# Patient Record
Sex: MALE | Race: WHITE | Employment: OTHER | URBAN - METROPOLITAN AREA
[De-identification: names, ages, dates, MRNs, and addresses within clinical notes are randomized per-mention and may not be internally consistent; named-entity substitution may affect disease eponyms.]

---

## 2019-03-21 ENCOUNTER — HOSPITAL ENCOUNTER (OUTPATIENT)
Dept: CT IMAGING | Age: 84
Discharge: HOME | End: 2019-03-21
Attending: FAMILY MEDICINE
Payer: MEDICARE

## 2019-03-21 DIAGNOSIS — R22.2 MASS IN CHEST: ICD-10-CM

## 2019-03-21 LAB — CREAT BLD-MCNC: 1.1 MG/DL (ref 0.6–1.3)

## 2019-03-21 PROCEDURE — 74011636320 HC RX REV CODE- 636/320: Performed by: RADIOLOGY

## 2019-03-21 PROCEDURE — 82565 ASSAY OF CREATININE: CPT

## 2019-03-21 PROCEDURE — 71260 CT THORAX DX C+: CPT

## 2019-03-21 RX ADMIN — IOPAMIDOL 100 ML: 612 INJECTION, SOLUTION INTRAVENOUS at 12:02

## 2020-10-19 ENCOUNTER — TRANSCRIBE ORDER (OUTPATIENT)
Dept: SCHEDULING | Age: 85
End: 2020-10-19

## 2020-10-19 DIAGNOSIS — E11.9 DIABETES MELLITUS (HCC): ICD-10-CM

## 2020-10-19 DIAGNOSIS — I10 ESSENTIAL HYPERTENSION, MALIGNANT: ICD-10-CM

## 2020-10-19 DIAGNOSIS — Z63.4 BEREAVEMENT, UNCOMPLICATED: ICD-10-CM

## 2020-10-19 DIAGNOSIS — K92.1 BLOOD IN STOOL: ICD-10-CM

## 2020-10-19 DIAGNOSIS — R10.9 STOMACH ACHE: Primary | ICD-10-CM

## 2020-10-19 SDOH — SOCIAL STABILITY - SOCIAL INSECURITY: DISSAPEARANCE AND DEATH OF FAMILY MEMBER: Z63.4

## 2020-10-23 ENCOUNTER — HOSPITAL ENCOUNTER (OUTPATIENT)
Dept: CT IMAGING | Age: 85
Discharge: HOME | End: 2020-10-23
Payer: MEDICARE

## 2020-10-23 DIAGNOSIS — R10.9 STOMACH ACHE: ICD-10-CM

## 2020-10-23 DIAGNOSIS — E11.9 DIABETES MELLITUS (HCC): ICD-10-CM

## 2020-10-23 DIAGNOSIS — I10 ESSENTIAL HYPERTENSION, MALIGNANT: ICD-10-CM

## 2020-10-23 DIAGNOSIS — Z63.4 BEREAVEMENT, UNCOMPLICATED: ICD-10-CM

## 2020-10-23 DIAGNOSIS — K92.1 BLOOD IN STOOL: ICD-10-CM

## 2020-10-23 PROCEDURE — 74176 CT ABD & PELVIS W/O CONTRAST: CPT

## 2020-10-23 SDOH — SOCIAL STABILITY - SOCIAL INSECURITY: DISSAPEARANCE AND DEATH OF FAMILY MEMBER: Z63.4

## 2021-12-07 ENCOUNTER — TRANSCRIBE ORDER (OUTPATIENT)
Dept: REGISTRATION | Age: 86
End: 2021-12-07

## 2021-12-07 ENCOUNTER — HOSPITAL ENCOUNTER (OUTPATIENT)
Dept: LAB | Age: 86
Discharge: HOME | End: 2021-12-07
Payer: MEDICARE

## 2021-12-07 DIAGNOSIS — Z01.812 PRE-PROCEDURAL LABORATORY EXAMINATIONS: ICD-10-CM

## 2021-12-07 DIAGNOSIS — Z01.812 PRE-PROCEDURAL LABORATORY EXAMINATIONS: Primary | ICD-10-CM

## 2021-12-07 PROCEDURE — U0005 INFEC AGEN DETEC AMPLI PROBE: HCPCS

## 2021-12-08 ENCOUNTER — ANESTHESIA EVENT (OUTPATIENT)
Dept: ENDOSCOPY | Age: 86
End: 2021-12-08
Payer: MEDICARE

## 2021-12-08 LAB
SARS-COV-2, XPLCVT: NOT DETECTED
SOURCE, COVRS: NORMAL

## 2021-12-10 ENCOUNTER — ANESTHESIA (OUTPATIENT)
Dept: ENDOSCOPY | Age: 86
End: 2021-12-10
Payer: MEDICARE

## 2021-12-10 ENCOUNTER — HOSPITAL ENCOUNTER (OUTPATIENT)
Age: 86
Setting detail: OUTPATIENT SURGERY
Discharge: HOME | End: 2021-12-10
Attending: SPECIALIST | Admitting: SPECIALIST
Payer: MEDICARE

## 2021-12-10 VITALS
WEIGHT: 165.57 LBS | HEIGHT: 68 IN | BODY MASS INDEX: 25.09 KG/M2 | DIASTOLIC BLOOD PRESSURE: 71 MMHG | HEART RATE: 70 BPM | OXYGEN SATURATION: 96 % | RESPIRATION RATE: 16 BRPM | SYSTOLIC BLOOD PRESSURE: 127 MMHG | TEMPERATURE: 97.8 F

## 2021-12-10 PROCEDURE — 74011000250 HC RX REV CODE- 250: Performed by: NURSE ANESTHETIST, CERTIFIED REGISTERED

## 2021-12-10 PROCEDURE — 76060000032 HC ANESTHESIA 0.5 TO 1 HR: Performed by: SPECIALIST

## 2021-12-10 PROCEDURE — 76040000007: Performed by: SPECIALIST

## 2021-12-10 PROCEDURE — 2709999900 HC NON-CHARGEABLE SUPPLY: Performed by: SPECIALIST

## 2021-12-10 PROCEDURE — 77030013992 HC SNR POLYP ENDOSC BSC -B: Performed by: SPECIALIST

## 2021-12-10 PROCEDURE — 88305 TISSUE EXAM BY PATHOLOGIST: CPT

## 2021-12-10 PROCEDURE — 74011250636 HC RX REV CODE- 250/636: Performed by: NURSE ANESTHETIST, CERTIFIED REGISTERED

## 2021-12-10 RX ORDER — PROPOFOL 10 MG/ML
INJECTION, EMULSION INTRAVENOUS AS NEEDED
Status: DISCONTINUED | OUTPATIENT
Start: 2021-12-10 | End: 2021-12-10 | Stop reason: HOSPADM

## 2021-12-10 RX ORDER — FLUMAZENIL 0.1 MG/ML
0.2 INJECTION INTRAVENOUS
Status: DISCONTINUED | OUTPATIENT
Start: 2021-12-10 | End: 2021-12-10 | Stop reason: HOSPADM

## 2021-12-10 RX ORDER — FENTANYL CITRATE 50 UG/ML
25 INJECTION, SOLUTION INTRAMUSCULAR; INTRAVENOUS AS NEEDED
Status: DISCONTINUED | OUTPATIENT
Start: 2021-12-10 | End: 2021-12-10 | Stop reason: HOSPADM

## 2021-12-10 RX ORDER — PROPOFOL 10 MG/ML
INJECTION, EMULSION INTRAVENOUS
Status: DISCONTINUED | OUTPATIENT
Start: 2021-12-10 | End: 2021-12-10 | Stop reason: HOSPADM

## 2021-12-10 RX ORDER — DEXTROMETHORPHAN/PSEUDOEPHED 2.5-7.5/.8
1.2 DROPS ORAL
Status: DISCONTINUED | OUTPATIENT
Start: 2021-12-10 | End: 2021-12-10 | Stop reason: HOSPADM

## 2021-12-10 RX ORDER — LISINOPRIL 20 MG/1
20 TABLET ORAL DAILY
COMMUNITY

## 2021-12-10 RX ORDER — METFORMIN HYDROCHLORIDE 500 MG/1
500 TABLET ORAL 2 TIMES DAILY WITH MEALS
COMMUNITY

## 2021-12-10 RX ORDER — NALOXONE HYDROCHLORIDE 0.4 MG/ML
0.4 INJECTION, SOLUTION INTRAMUSCULAR; INTRAVENOUS; SUBCUTANEOUS
Status: DISCONTINUED | OUTPATIENT
Start: 2021-12-10 | End: 2021-12-10 | Stop reason: HOSPADM

## 2021-12-10 RX ORDER — ASPIRIN 81 MG/1
TABLET ORAL DAILY
COMMUNITY

## 2021-12-10 RX ORDER — ZINC GLUCONATE 50 MG
TABLET ORAL
COMMUNITY

## 2021-12-10 RX ORDER — AMLODIPINE BESYLATE 5 MG/1
5 TABLET ORAL DAILY
COMMUNITY

## 2021-12-10 RX ORDER — SODIUM CHLORIDE 9 MG/ML
50 INJECTION, SOLUTION INTRAVENOUS CONTINUOUS
Status: DISCONTINUED | OUTPATIENT
Start: 2021-12-10 | End: 2021-12-10 | Stop reason: HOSPADM

## 2021-12-10 RX ORDER — OMEPRAZOLE 20 MG/1
20 CAPSULE, DELAYED RELEASE ORAL DAILY
COMMUNITY

## 2021-12-10 RX ORDER — LATANOPROST 50 UG/ML
1 SOLUTION/ DROPS OPHTHALMIC
COMMUNITY

## 2021-12-10 RX ORDER — ATORVASTATIN CALCIUM 80 MG/1
80 TABLET, FILM COATED ORAL DAILY
COMMUNITY

## 2021-12-10 RX ORDER — LIDOCAINE HYDROCHLORIDE 20 MG/ML
INJECTION, SOLUTION EPIDURAL; INFILTRATION; INTRACAUDAL; PERINEURAL AS NEEDED
Status: DISCONTINUED | OUTPATIENT
Start: 2021-12-10 | End: 2021-12-10 | Stop reason: HOSPADM

## 2021-12-10 RX ORDER — MIDAZOLAM HYDROCHLORIDE 1 MG/ML
.25-5 INJECTION, SOLUTION INTRAMUSCULAR; INTRAVENOUS AS NEEDED
Status: DISCONTINUED | OUTPATIENT
Start: 2021-12-10 | End: 2021-12-10 | Stop reason: HOSPADM

## 2021-12-10 RX ORDER — CHOLECALCIFEROL (VITAMIN D3) 125 MCG
CAPSULE ORAL
COMMUNITY

## 2021-12-10 RX ADMIN — PROPOFOL INJECTABLE EMULSION 30 MG: 10 INJECTION, EMULSION INTRAVENOUS at 13:19

## 2021-12-10 RX ADMIN — PROPOFOL 120 MCG/KG/MIN: 10 INJECTION, EMULSION INTRAVENOUS at 13:27

## 2021-12-10 RX ADMIN — PROPOFOL INJECTABLE EMULSION 80 MG: 10 INJECTION, EMULSION INTRAVENOUS at 13:16

## 2021-12-10 RX ADMIN — LIDOCAINE HYDROCHLORIDE 100 MG: 20 INJECTION, SOLUTION INTRAVENOUS at 13:14

## 2021-12-10 NOTE — PROGRESS NOTES
Endoscopy discharge instructions have been reviewed and given to patient. The patient verbalized understanding and acceptance of instructions. Dr. Monie Calvert discussed with patient procedure findings and next steps.

## 2021-12-10 NOTE — INTERVAL H&P NOTE
Pre-Endoscopy H&P Update  Chief complaint/HPI/ROS:  The indication for the procedure, the patient's history and the patient's current medications are reviewed prior to the procedure and that data is reported on the H&P to which this document is attached. Any significant complaints with regard to organ systems will be noted, and if not mentioned then a review of systems is not contributory. Past Medical History:   Diagnosis Date    Back pain     CAD (coronary artery disease)     3 stents    Diabetes ([address]     Diabetes mellitus, type II ([facility]     Hypertension       Past Surgical History:   Procedure Laterality Date    HX COLONOSCOPY      x2    HX CORONARY ARTERY BYPASS GRAFT      3 stents placed     Social   Social History     Tobacco Use    Smoking status: Never Smoker    Smokeless tobacco: Never Used   Substance Use Topics    Alcohol use: Yes     Comment: 1-2x week       Family History   Problem Relation Age of Onset    Kidney Disease Mother     Heart Attack Father     Heart Disease Brother       Allergies   Allergen Reactions    Latex, Natural Rubber Rash and Itching    Ambien [Zolpidem] Other (comments)     Sleep walks, loses time      Prior to Admission Medications   Prescriptions Last Dose Informant Patient Reported? Taking? Magnesium Oxide 500 mg cap   Yes Yes   Sig: Take  by mouth. amLODIPine (NORVASC) 5 mg tablet 12/10/2021 at Unknown time  Yes Yes   Sig: Take 5 mg by mouth daily. aspirin delayed-release 81 mg tablet 12/10/2021 at Unknown time  Yes Yes   Sig: Take  by mouth daily. atorvastatin (LIPITOR) 80 mg tablet 12/10/2021 at Unknown time  Yes Yes   Sig: Take 80 mg by mouth daily. cholecalciferol, vitamin D3, (Vitamin D3) 50 mcg (2,000 unit) tab   Yes Yes   Sig: Take  by mouth. lactobacillus rhamnosus  (PROBIOTIC DIGESTIVE CARE PO)   Yes Yes   Sig: Take  by mouth.   latanoprost (XALATAN) 0.005 % ophthalmic solution   Yes Yes   Sig: Administer 1 Drop to both eyes nightly.    lisinopriL (PRINIVIL, ZESTRIL) 20 mg tablet 12/10/2021 at Unknown time  Yes Yes   Sig: Take 20 mg by mouth daily. metFORMIN (GLUCOPHAGE) 500 mg tablet 12/10/2021 at Unknown time  Yes Yes   Sig: Take 500 mg by mouth two (2) times daily (with meals). omeprazole (PRILOSEC) 20 mg capsule   Yes Yes   Sig: Take 20 mg by mouth daily. Facility-Administered Medications: None       PHYSICAL EXAM:  The patient is examined immediately prior to the procedure. Visit Vitals  /71   Pulse 70   Temp 97.8 °F (36.6 °C)   Resp 16   Ht 5' 8\" (1.727 m)   Wt 75.1 kg (165 lb 9.1 oz)   SpO2 96%   BMI 25.17 kg/m²     Gen: Appears comfortable, no distress. Pulm: comfortable respirations with no abnormal audible breath sounds  HEART: well perfused, no abnormal audible heart sounds  GI: abdomen flat. PLAN:  Informed consent discussion held, patient afforded an opportunity to ask questions and all questions answered. After being advised of the risks, benefits, and alternatives, the patient requested that we proceed and indicated so on a written consent form. Will proceed with procedure as planned.   Thao Emery MD

## 2021-12-10 NOTE — ANESTHESIA POSTPROCEDURE EVALUATION
Procedure(s):  ESOPHAGOGASTRODUODENOSCOPY (EGD)  COLONOSCOPY  ENDOSCOPIC POLYPECTOMY. MAC    Anesthesia Post Evaluation        Patient location during evaluation: PACU  Level of consciousness: awake  Pain management: adequate  Airway patency: patent  Anesthetic complications: no  Cardiovascular status: acceptable  Respiratory status: acceptable  Hydration status: acceptable  Post anesthesia nausea and vomiting:  none      INITIAL Post-op Vital signs:   Vitals Value Taken Time   /71 12/10/21 1355   Temp 36.6 °C (97.8 °F) 12/10/21 1340   Pulse 68 12/10/21 1356   Resp 16 12/10/21 1356   SpO2 85 % 12/10/21 1400   Vitals shown include unvalidated device data.

## 2021-12-10 NOTE — ANESTHESIA PREPROCEDURE EVALUATION
Relevant Problems   No relevant active problems       Anesthetic History   No history of anesthetic complications            Review of Systems / Medical History  Patient summary reviewed, nursing notes reviewed and pertinent labs reviewed    Pulmonary  Within defined limits                 Neuro/Psych   Within defined limits           Cardiovascular    Hypertension          CAD and cardiac stents         GI/Hepatic/Renal  Within defined limits              Endo/Other    Diabetes         Other Findings            Physical Exam    Airway  Mallampati: II  TM Distance: 4 - 6 cm  Neck ROM: normal range of motion   Mouth opening: Normal     Cardiovascular    Rhythm: regular  Rate: normal         Dental    Dentition: Lower dentition intact and Upper dentition intact     Pulmonary  Breath sounds clear to auscultation               Abdominal         Other Findings            Anesthetic Plan    ASA: 3  Anesthesia type: MAC          Induction: Intravenous  Anesthetic plan and risks discussed with: Patient

## 2021-12-10 NOTE — PROGRESS NOTES
Mauro Sethi .  1935  [id number]    Situation:  Verbal report received from:  Keith Connors RN   Procedure: Procedure(s):  ESOPHAGOGASTRODUODENOSCOPY (EGD)  COLONOSCOPY  ENDOSCOPIC POLYPECTOMY    Background:    Preoperative diagnosis: Melena [K92.1]  Abdominal pain, unspecified abdominal location [R10.9]  Postoperative diagnosis: EGD: normal exam  Colon: transverse colon polyp, diverticulosis, hemorrhoids    :  Dr. Vivian Clark   Assistant(s): Endoscopy Technician-1: Arnaud Stratton  Endoscopy RN-1: Miguel Ángel Franco RN    Specimens:   ID Type Source Tests Collected by Time Destination   1 : transverse colon polyp Preservative Colon, Transverse  Bg Mckeon MD 12/10/2021 1332 Pathology     H. Pylori  no    Assessment:  Intra-procedure medications       Anesthesia gave intra-procedure sedation and medications, see anesthesia flow sheet yes    Intravenous fluids: NS@ KVO     Vital signs stable   yes    Abdominal assessment: round and soft   yes    Recommendation:  Discharge patient per MD order  yes.   Return to floor  outpatient  Family or Friend   Spouse   Permission to share finding with family or friend yes

## 2021-12-10 NOTE — PROCEDURES
[address] [facility] JUAN Welch MD  [phone]      December 10, 2021    Esophagogastroduodenoscopy & Colonoscopy Procedure Note  Sen Fernandez SrMina  : 1935  [employer] Medical Record Number: [id number]      Indications:    Melena/hematochezia Personal history of colon polyps (screening only)   Referring Physician:  Etelvina Etienne MD  Anesthesia/Sedation: Conscious Sedation/Moderate Sedation/MAC  Endoscopist:  Dr. Kc Rubin  Complications:  None  Estimated Blood Loss:  None    Permit:  The indications, risks, benefits and alternatives were reviewed with the patient or their decision maker who was provided an opportunity to ask questions and all questions were answered. The specific risks of esophagogastroduodenoscopy with conscious sedation were reviewed, including but not limited to anesthetic complication, bleeding, adverse drug reaction, missed lesion, infection, IV site reactions, and intestinal perforation which would lead to the need for surgical repair. Alternatives to EGD and colonoscopy including radiographic imaging, observation without testing, or laboratory testing were reviewed as well as the limitations of those alternatives discussed. After considering the options and having all their questions answered, the patient or their decision maker provided both verbal and written consent to proceed. -----------EGD------------   Procedure in Detail:  After obtaining informed consent, positioning of the patient in the left lateral decubitus position, and conduction of a pre-procedure pause or \"time out\" the endoscope was introduced into the mouth and advanced to the duodenum. A careful inspection was made, and findings or interventions are described below.     Findings:   Esophagus:normal  Stomach: normal   Duodenum/jejunum: normal        ----------Colonoscopy-----------    Procedure in Detail:  After obtaining informed consent, positioning of the patient in the left lateral decubitus position, and conduction of a pre-procedure pause or \"time out\" the endoscope was introduced into the anus and advanced to the cecum, which was identified by the ileocecal valve and appendiceal orifice. The quality of the colonic preparation was good. A careful inspection was made as the colonoscope was withdrawn, findings and interventions are described below. Findings:   4mm polyp in the transverse colon removed with cold snare, retrieved. There is diverticulosis in the sigmoid colon without complications such as bleeding, inflammatory change, or luminal narrowing. In the rectum, medium internal hemorrhoids are noted without bleeding.      ------------------------------  Specimens:    See above    Complications:   None; patient tolerated the procedure well. Impressions:  EGD:  Normal.  Colonoscopy: Colon polyp, diverticulosis, hemorrhoids      Recommendations:     - Await pathology. Thank you for entrusting me with this patient's care. Please do not hesitate to contact me with any questions or if I can be of assistance with any of your other patients' GI needs. Signed By: Brayan Otero MD                        December 10, 2021    Surgical assistant none. Implants none unless specified.

## 2021-12-10 NOTE — DISCHARGE INSTRUCTIONS
[address] [facility] JUAN Nielsen MD  [phone]      December 10, 2021    [address] [place] [address] N: 1935    COLONOSCOPY DISCHARGE INSTRUCTIONS    If there is redness at IV site you should apply warm compress to area. If redness or soreness persist contact Dr. Galilea Nielsen' or your primary care doctor. There may be a slight amount of blood passed from the rectum. Gaseous discomfort may develop, but walking, belching will help relieve this. You may not operate a vehicle for 12 hours  You may not operate machinery or dangerous appliances for rest of today  You may not drink alcoholic beverages for 12 hours  Avoid making any critical decisions for 24 hours    DIET:  You may resume your normal diet, but some patients find that heavy or large meals may lead to indigestion or vomiting. I suggest a light meal as first food intake. MEDICATIONS:  The use of some over-the-counter pain medication may lead to bleeding after colon biopsies or polyp removal.  Tylenol (also called acetaminophen) is safe to take even if you have had colonoscopy with polyp removal.  Based on the procedure you had today you may not safely take aspirin or aspirin-like products for the next ten (10) days. Remember that Tylenol (also called acetaminophen) is safe to take after colonoscopy even if you have had biopsies or polyps removed. ACTIVITY:  You may resume your normal household activities, but it is recommended that you spend the remainder of the day resting -  avoid any strenuous activity. CALL DR. Malcolm Cerrato' OFFICE IF:  Increasing pain, nausea, vomiting  Abdominal distension (swelling)  Significant new or increased bleeding (oral or rectal)  Fever/Chills  Chest pain/shortness of breath                       Additional instructions: We found and removed one small polyp but no other problems noted.    No aspirin 10 days, I will contact you with the polyp results in about a week. It was an honor to be your doctor today. Please let me or my office staff know if you have any feedback about today's procedure. Stvee Nguyen MD    Colonoscopy saves lives, and can prevent colon cancer. Everyone aged 48 or older needs colonoscopy.   Tell your family and friends: get the test!

## 2021-12-10 NOTE — H&P
Date: 10/21/2021 2:30 PM  Patient Name: Alon Petit  Account #: [id number]  Gender: Male   (age): 1935 (85)  Provider:   Anneliese Leon. Ashwini Gay MD  Referring Physician:   Estela Kraft [place], [address] [place] [address]  [phone] (phone)  [phone] (fax)  Chief Complaint:    I've seen some black stool again. History of Present Illness:  68-year-old male who had endoscopy and colonoscopy 18 months ago without significant findings reports a reoccurrence of intermittent black stool. This is in association with crampy upper abdominal pain which radiates to the bilateral lower quadrant. This is also in association with intermittent problems with diarrhea. He visited a local emergency room 3 months ago at laboratory studies and CT scan without overt finding. At that time the emergency room doctor consider irritable bowel syndrome to be a likely explanation for his symptoms and prescribed dicyclomine. He notes that when he gets the crampy pain and he takes dicyclomine it gets better. We discussed the indications, risks, benefits, and alternatives to diagnostic upper endoscopy and colonoscopy in light of his concern for gastrointestinal blood loss and he asked that we proceed.   Past Medical History  Medical Conditions:   Chest pain  High blood pressure  S/P Stents x3  Surgical Procedures:   Stents 3X  Knee Surgery ( Left )  Gallbladder surgery  Dx Studies:   Abdominal U/S, 01/10/16  Colonoscopy, 2020  Colonoscopy, 12/10/10  CT Scan, 16  EGD, 2020  Endoscopy, 16  Pre-Procedure Call, 2016  Medications:   amlodipine5 mgTake 1 tablet by mouth once a day as directed  zatyalc23 mgTake 1 tablet by mouth once a day as directed  cezvmwfg15 mgTake 1 tablet by mouth once a day as directed  amebcgamnicr66 mgTake 1 tablet by mouth once a day as directed  cholecalciferol (vitamin D3)50 mcg (2,000 unit)Take 1 tablet by mouth once a day as directed  latanoprost0.005%Instill as needed  oyrrppzzdz88 mgTake 1 tablet by mouth once a day as directed  emrqmllvp951 mgTake 1 tablet by mouth twice a day as directed  jlhezbxwuh56 mgTake 1 capsule by mouth once a day as directed  Uuyowcjxm76 billion cellTake 1 capsule by mouth once a day as directed  Allergies:   Ambien  Latex  Immunizations:   Influenza, seasonal, injectable, 09/01/2021  Flu vaccine, 09/01/2020  zoster, 2010  Pneumococcal conjugate PCV 13, 2015/ 09/2019  Social History  Alcohol:   None  Tobacco:   Never smoker  Drugs:   None  Exercise:   None  Caffeine:   None  Marital Status:         Occupation:    Retired     Family History   No history of Colon Cancer, Colon Polyps, Esophogeal Cancer, GI Cancers, Liver disease  Son: Diagnosed with Crohn's disease or ulcerative colitis; Review of Systems:  Cardiovascular: Denies chest pain, irregular heart beat, palpitations, peripheral edema, syncope, Sweats. Constitutional: Denies fatigue, fever, loss of appetite, weight gain, weight loss. ENMT: Denies nose bleeds, sore throat, hearing loss. Endocrine: Denies excessive thirst, heat intolerance. Eyes: Denies loss of vision. Gastrointestinal: Denies abdominal pain, abdominal swelling, change in bowel habits, constipation, diarrhea, Bloating/gas, heartburn, jaundice, nausea, rectal bleeding, stomach cramps, vomiting, dysphagia, rectal pain, Stool incontinence, hematemesis. Genitourinary: Denies dark urine, dysuria, frequent urination, hematuria, incontinence. Hematologic/Lymphatic: Denies easy bruising, prolonged bleeding. Integumentary: Denies itching, rashes, sun sensitivity. Musculoskeletal: Denies arthritis, back pain, gout, joint pain, muscle weakness, stiffness. Neurological: Denies dizziness, fainting, frequent headaches, memory loss. Psychiatric: Denies anxiety, depression, difficulty sleeping, hallucinations, nervousness, panic attacks, paranoia. Respiratory: Denies cough, dyspnea, wheezing.   Vital Signs: BP  (mmHg)  Pulse  (ppm) Weight (lbs/oz) Height (ft/in) BMI Temp  121/82 77 169 / 5 / 8 25.69 99 (F)  Physical Exam:  Constitutional:  Appearance: No distress, appears comfortable. Communication: Understands/receives spoken information. Skin:  Inspection: No rash, no jaundice. Head/face: Inspection: Normacephalic, atraumatic. Eyes:  Conjunctivae/lids: Normal.  Pupils/irises: Pupils equal, round and normal.  ENMT:  External: Normal.  Hearing: Normal.  Neck:  Neck: Normal appearance, trachea midline. Jugular veins: No JVD noted. Respiratory:  Effort: Normal respiratory effort, comfortable, speaks in complete sentences. Auscultation: normal breath sounds, no rubs, wheezes or rhonchi. Gastrointestinal/Abdomen:  Hernias: There is protrusion on valsava left inguinal > right. No incarceration. .  Musculoskeletal:  Gait/station: normal.  Digits/nails: Normal, no spooning of nails, clubbing, or splinter hemorrhages, no clubbing, cyanosis, petechiae or other inflammatory conditions. Psychiatric:  Judgment/insight: Normal, normal judgement, normal insight. Orientation: oriented to time, space and person. Lab Results: No Electronic Results  Impressions:   Melena  Plan:   Education handout on BMI Healthy Weight  Upper Endoscopy  Colonoscopy  Risk & Medical Necessity:    The level of medical decision making for this visit is low. The number and complexity of problems addressed are moderate. The risk of complications and/or morbidity or mortality of patient management is low. Meg Tomas MD    Electronically signed on 10/21/2021 2:21:29 PM by Meg Tomas, [address] [place] [address], MRN [id number],  1935 Follow Up,

## 2023-05-12 RX ORDER — LATANOPROST 50 UG/ML
1 SOLUTION/ DROPS OPHTHALMIC
COMMUNITY

## 2023-05-12 RX ORDER — ATORVASTATIN CALCIUM 80 MG/1
80 TABLET, FILM COATED ORAL DAILY
COMMUNITY

## 2023-05-12 RX ORDER — LISINOPRIL 20 MG/1
20 TABLET ORAL DAILY
COMMUNITY

## 2023-05-12 RX ORDER — AMLODIPINE BESYLATE 5 MG/1
5 TABLET ORAL DAILY
COMMUNITY

## 2023-05-12 RX ORDER — ASPIRIN 81 MG/1
TABLET ORAL DAILY
COMMUNITY

## 2023-05-12 RX ORDER — FOLIC ACID 0.8 MG
TABLET ORAL
COMMUNITY

## 2023-05-12 RX ORDER — OMEPRAZOLE 20 MG/1
20 CAPSULE, DELAYED RELEASE ORAL DAILY
COMMUNITY

## 2024-07-10 ENCOUNTER — OFFICE VISIT (OUTPATIENT)
Age: 89
End: 2024-07-10
Payer: MEDICARE

## 2024-07-10 VITALS
TEMPERATURE: 96 F | BODY MASS INDEX: 24.45 KG/M2 | OXYGEN SATURATION: 96 % | DIASTOLIC BLOOD PRESSURE: 70 MMHG | HEART RATE: 71 BPM | HEIGHT: 69 IN | SYSTOLIC BLOOD PRESSURE: 120 MMHG

## 2024-07-10 DIAGNOSIS — F02.C11 SEVERE LATE ONSET ALZHEIMER'S DEMENTIA WITH AGITATION (HCC): Primary | ICD-10-CM

## 2024-07-10 DIAGNOSIS — G30.1 SEVERE LATE ONSET ALZHEIMER'S DEMENTIA WITH AGITATION (HCC): Primary | ICD-10-CM

## 2024-07-10 PROCEDURE — 4004F PT TOBACCO SCREEN RCVD TLK: CPT | Performed by: PSYCHIATRY & NEUROLOGY

## 2024-07-10 PROCEDURE — 1123F ACP DISCUSS/DSCN MKR DOCD: CPT | Performed by: PSYCHIATRY & NEUROLOGY

## 2024-07-10 PROCEDURE — G8428 CUR MEDS NOT DOCUMENT: HCPCS | Performed by: PSYCHIATRY & NEUROLOGY

## 2024-07-10 PROCEDURE — 99205 OFFICE O/P NEW HI 60 MIN: CPT | Performed by: PSYCHIATRY & NEUROLOGY

## 2024-07-10 PROCEDURE — G8420 CALC BMI NORM PARAMETERS: HCPCS | Performed by: PSYCHIATRY & NEUROLOGY

## 2024-07-10 RX ORDER — DONEPEZIL HYDROCHLORIDE 5 MG/1
5 TABLET, FILM COATED ORAL DAILY
Qty: 30 TABLET | Refills: 5 | Status: SHIPPED | OUTPATIENT
Start: 2024-07-10

## 2024-07-10 RX ORDER — ROSUVASTATIN CALCIUM 40 MG/1
1 TABLET, COATED ORAL
COMMUNITY

## 2024-07-10 RX ORDER — QUETIAPINE FUMARATE 25 MG/1
TABLET, FILM COATED ORAL
COMMUNITY
Start: 2024-06-13

## 2024-07-10 ASSESSMENT — MINI MENTAL STATE EXAM
SUM ALL MMSE QUESTIONS FOR TOTAL SCORE [OUT OF 30].: 9
SAY: PUT THE PAPER DOWN ON THE FLOOR, SCORE IF PAPER IS PLACED BACK ON FLOOR: 1
WHAT CITY/TOWN ARE WE IN?: 0
WHAT COUNTRY ARE WE IN?: 0
WHAT YEAR IS THIS?: 0
SAY: I WOULD LIKE YOU TO REPEAT THIS PHRASE AFTER ME: NO IFS, ANDS, OR BUTS.: 1
NOW WHAT WERE THE THREE OBJECTS I ASKED YOU TO REMEMBER?: 0
SAY: I AM GOING TO NAME THREE OBJECTS. WHEN I AM FINISHED, I WANT YOU TO REPEAT
THEM. REMEMBER WHAT THEY ARE BECAUSE I AM GOING TO ASK YOU TO NAME THEM AGAIN IN
A FEW MINUTES.  SAY THE FOLLOWING WORDS SLOWLY AT 1-SECOND INTERVALS - BALL/ CAR/ MAN [ITERATIONS FOR REPEAT ADMINISTRATION]: 1
SHOW: WRISTWATCH [OBJECT] ASK: WHAT IS THIS CALLED?: 1
WHAT DAY OF THE WEEK IS THIS?: 0
SAY: I WOULD LIKE YOU TO COUNT BACKWARD FROM 100 BY SEVENS: 0
WHAT STATE [OR PROVINCE] ARE WE IN?: 1
SAY: READ THE WORDS ON THE PAGE AND THEN DO WHAT IT SAYS. THEN HAND THE PERSON
THE SHEET WITH CLOSE YOUR EYES ON IT. IF THE SUBJECT READS AND DOES NOT CLOSE THEIR EYES, REPEAT UP TO THREE TIMES. SCORE ONLY IF SUBJECT CLOSES EYES.: 1
WHAT FLOOR ARE WE ON [IN FACILITY]?/ WHAT ROOM ARE WE IN [IN HOME]?: 0
SHOW: PENCIL [OBJECT] ASK: WHAT IS THIS CALLED?: 1
ASK THE PERSON IF HE IS RIGHT OR LEFT-HANDED. TAKE A PIECE OF PAPER AND HOLD IT UP IN
FRONT OF THE PERSON. SAY: TAKE THIS PAPER IN YOUR RIGHT/LEFT HAND (WHICHEVER IS NON-
DOMINANT), SCORE IF PAPER IS PICKED UP IN CORRECT HAND.: 1
WHAT IS TODAY'S DATE?: 0
WHAT IS THE NAME OF THIS BUILDING [IN FACILITY]?/WHAT IS THE STREET ADDRESS OF THIS HOUSE [IN HOME]?: 0
SAY: FOLD THE PAPER IN HALF ONCE WITH BOTH HANDS, SCORE IF PAPER IS CORRECTLY FOLDED IN HALF.: 1
WHICH SEASON IS THIS?: 0
WHAT MONTH IS THIS?: 0
PLACE DESIGN, ERASER AND PENCIL IN FRONT OF THE PERSON.  SAY:  COPY THIS DESIGN PLEASE.  SHOW: DESIGN. ALLOW: MULTIPLE TRIES. WAIT UNTIL PERSON IS FINISHED AND HANDS IT BACK. SCORE: ONLY FOR DIAGRAM WITH 4-SIDED FIGURE BETWEEN TWO 5-SIDED FIGURES: 0
HAND THE PERSON A PENCIL AND PAPER. SAY: WRITE ANY COMPLETE SENTENCE ON THAT
PIECE OF PAPER. (NOTE: THE SENTENCE MUST MAKE SENSE. IGNORE SPELLING ERRORS): 0

## 2024-07-10 NOTE — PROGRESS NOTES
Language: naming, repetition, fluency normal             Memory: intact recent and remote memory  Cranial Nerves:  I: smell Not tested   II: visual fields Full to confrontation   II: pupils Equal, round, reactive to light   II: optic disc No papilledema   III,VII: ptosis none   III,IV,VI: extraocular muscles  Full ROM   V: mastication normal   V: facial light touch sensation  normal   VII: facial muscle function   symmetric   VIII: hearing symmetric   IX: soft palate elevation  normal   XI: trapezius strength  5/5   XI: sternocleidomastoid strength 5/5   XI: neck flexion strength  5/5   XII: tongue  midline     Motor: normal bulk and tone, no tremor              Strength: 5/5 all four extremities  Sensory: intact to LT, PP, vibration, and temperature  Reflexes: 2+ throughout  Coordination: Good FTN and HTS, Rhomberg negative  Gait: normal gait including tandem          IMPRESSION  Allan Simon Sr. is a 88 y.o. male who  has a past medical history of Back pain, CAD (coronary artery disease), Diabetes (HCC), Diabetes mellitus, type II (HCC), and Hypertension. Who for the past 5 to 6 yrs, patient noted progressive memory issues described as getting lost while driving. In Dec 2023, patient was found by police (apparently was swerving). Was brought to [facility] ER. Not driving anymore. (-) hallucinations. (+) agitation -placed on Seroquel 25 mg BID with benefit.  Wife takes care of finances and paying bills.    MMSE 9/30 concerning for Alzhemier's dementia, severe stage.    CT head (12/11/23): No acute intracranial process.   2. Diffuse atrophy and sequela of chronic small vessel ischemia.   Considerations include    RECOMMENDATIONS  1. I had a long discussion with patient and wife. Discussed diagnosis, pathophysiology prognosis, available treatment and formulating plan. Reviewed test results. All questions were answered.  2. Start Aricept 5 mg QHS  3. Advise to keep mind active, stay socially engaged, exercise and eat

## 2024-07-11 ENCOUNTER — TELEPHONE (OUTPATIENT)
Age: 89
End: 2024-07-11

## 2024-07-12 ENCOUNTER — TELEPHONE (OUTPATIENT)
Age: 89
End: 2024-07-12

## 2024-07-12 NOTE — TELEPHONE ENCOUNTER
Called Robyn. HIPAA verified. She states that on the pt AVS the pt is not taking the atorvastatin 80mg. Inform Robyn I will document that pt is not taking atorvastatin. Robyn verbalizes understanding.

## 2024-07-17 ENCOUNTER — TELEPHONE (OUTPATIENT)
Age: 89
End: 2024-07-17

## 2024-09-12 ENCOUNTER — OFFICE VISIT (OUTPATIENT)
Age: 89
End: 2024-09-12
Payer: MEDICARE

## 2024-09-12 ENCOUNTER — PROCEDURE VISIT (OUTPATIENT)
Age: 89
End: 2024-09-12
Payer: MEDICARE

## 2024-09-12 DIAGNOSIS — G30.1 SEVERE LATE ONSET ALZHEIMER'S DEMENTIA WITH AGITATION (HCC): Primary | ICD-10-CM

## 2024-09-12 DIAGNOSIS — F02.C11 SEVERE LATE ONSET ALZHEIMER'S DEMENTIA WITH AGITATION (HCC): Primary | ICD-10-CM

## 2024-09-12 DIAGNOSIS — Z86.59 HISTORY OF DEPRESSION: ICD-10-CM

## 2024-09-12 PROCEDURE — 90791 PSYCH DIAGNOSTIC EVALUATION: CPT | Performed by: CLINICAL NEUROPSYCHOLOGIST

## 2024-09-12 PROCEDURE — 90785 PSYTX COMPLEX INTERACTIVE: CPT | Performed by: CLINICAL NEUROPSYCHOLOGIST

## 2024-09-12 PROCEDURE — 96132 NRPSYC TST EVAL PHYS/QHP 1ST: CPT | Performed by: CLINICAL NEUROPSYCHOLOGIST

## 2024-09-12 PROCEDURE — 96138 PSYCL/NRPSYC TECH 1ST: CPT | Performed by: CLINICAL NEUROPSYCHOLOGIST

## 2024-09-12 PROCEDURE — 96133 NRPSYC TST EVAL PHYS/QHP EA: CPT | Performed by: CLINICAL NEUROPSYCHOLOGIST

## 2024-09-12 PROCEDURE — 96139 PSYCL/NRPSYC TST TECH EA: CPT | Performed by: CLINICAL NEUROPSYCHOLOGIST

## 2024-09-24 ENCOUNTER — TELEMEDICINE (OUTPATIENT)
Age: 89
End: 2024-09-24

## 2024-09-24 DIAGNOSIS — F02.C11 SEVERE LATE ONSET ALZHEIMER'S DEMENTIA WITH AGITATION (HCC): Primary | ICD-10-CM

## 2024-09-24 DIAGNOSIS — Z86.59 HISTORY OF DEPRESSION: ICD-10-CM

## 2024-09-24 DIAGNOSIS — G30.1 SEVERE LATE ONSET ALZHEIMER'S DEMENTIA WITH AGITATION (HCC): Primary | ICD-10-CM

## 2024-10-15 NOTE — PROGRESS NOTES
extremities  Sensory: intact to LT, PP and JPS  Good FTN and HTS   Gait: Normal    Assessment:       ICD-10-CM    1. 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 I had a long discussion with patient and wife. All questions were answered.  2. Will stop Aricept 5 mg QHS due to agitation  3. Switch to Namenda starter pack  4. Advise to take Seroquel 25 mg 2 tabs instead (was taking 1 tab BID) so it can help him sleep at night and not cause morning sleepiness  5. Will refer to home health (PT, , skilled nursing, health aide)  6. Continue to keep mind active, stay socially engaged, exercise and eat a Mediterranean diet  7. Wife looking for resources in the community  8. Lives with wife. Has a niece  9. Discussed neuropsychological results: \"he should not live independently without appropriate supervision for those domains with a heavy memory emphasis.  This includes nutritional/milk abrasion supervision, day-to-day household safety supervision, medication management supervision and supervision of financial dealings.  No driving.  He lacks capacity to make informed medical decisions, financial decisions, develop, to , or to own a firearm.\"      Return in about 3 months (around 1/16/2025).           Erasto Solis MD  Diplomate, American Board of Psychiatry and Neurology  Diplomate, Neuromuscular Medicine  Diplomate, American Board of Electrodiagnostic Medicine        I spent total of 40 mins with the patient, greater than 50% of the visit was spent on providing counseling and coordination of care.

## 2024-10-16 ENCOUNTER — OFFICE VISIT (OUTPATIENT)
Age: 89
End: 2024-10-16
Payer: MEDICARE

## 2024-10-16 VITALS
HEART RATE: 67 BPM | HEIGHT: 69 IN | SYSTOLIC BLOOD PRESSURE: 120 MMHG | DIASTOLIC BLOOD PRESSURE: 70 MMHG | OXYGEN SATURATION: 98 % | TEMPERATURE: 95.3 F | BODY MASS INDEX: 24.45 KG/M2

## 2024-10-16 DIAGNOSIS — F02.C11 SEVERE LATE ONSET ALZHEIMER'S DEMENTIA WITH AGITATION (HCC): Primary | ICD-10-CM

## 2024-10-16 DIAGNOSIS — G30.1 SEVERE LATE ONSET ALZHEIMER'S DEMENTIA WITH AGITATION (HCC): Primary | ICD-10-CM

## 2024-10-16 PROCEDURE — G8420 CALC BMI NORM PARAMETERS: HCPCS | Performed by: PSYCHIATRY & NEUROLOGY

## 2024-10-16 PROCEDURE — 99215 OFFICE O/P EST HI 40 MIN: CPT | Performed by: PSYCHIATRY & NEUROLOGY

## 2024-10-16 PROCEDURE — G8428 CUR MEDS NOT DOCUMENT: HCPCS | Performed by: PSYCHIATRY & NEUROLOGY

## 2024-10-16 PROCEDURE — 1036F TOBACCO NON-USER: CPT | Performed by: PSYCHIATRY & NEUROLOGY

## 2024-10-16 PROCEDURE — 1123F ACP DISCUSS/DSCN MKR DOCD: CPT | Performed by: PSYCHIATRY & NEUROLOGY

## 2024-10-16 PROCEDURE — G8484 FLU IMMUNIZE NO ADMIN: HCPCS | Performed by: PSYCHIATRY & NEUROLOGY

## 2024-10-16 RX ORDER — MEMANTINE HYDROCHLORIDE 5 MG/1
TABLET ORAL
Qty: 70 TABLET | Refills: 0 | Status: SHIPPED | OUTPATIENT
Start: 2024-10-16

## 2024-11-07 ENCOUNTER — TELEPHONE (OUTPATIENT)
Age: 89
End: 2024-11-07

## 2024-11-07 NOTE — TELEPHONE ENCOUNTER
Patients wife requesting a call regarding her  being contacted by [facility].    Robyn stated it's been over a week and she has not heard from them yet and she left a message and still no response.

## 2024-11-07 NOTE — TELEPHONE ENCOUNTER
Called Robyn. HIPAA verified. No answer left message on VM stating the referral to home health was faxed to [facility] on 10/23/24 w/ confirmation and sent to scanning. Also left the number to [facility] on VM.

## 2024-11-08 NOTE — TELEPHONE ENCOUNTER
Patient wife called stating [facility] stated they never received a fax on 10/23/24    His wife requesting a call to discuss another facility    [facility]   Fax - [phone]

## 2024-11-14 ENCOUNTER — TELEPHONE (OUTPATIENT)
Age: 89
End: 2024-11-14

## 2024-11-14 DIAGNOSIS — F02.C11 SEVERE LATE ONSET ALZHEIMER'S DEMENTIA WITH AGITATION (HCC): Primary | ICD-10-CM

## 2024-11-14 DIAGNOSIS — G30.1 SEVERE LATE ONSET ALZHEIMER'S DEMENTIA WITH AGITATION (HCC): Primary | ICD-10-CM

## 2024-11-14 NOTE — TELEPHONE ENCOUNTER
Pt is looking to discuss whether or not pt should continue to take medication memantine 5mg.    Please contact.

## 2024-11-15 NOTE — TELEPHONE ENCOUNTER
Patient's spouse is calling again in regards the last message that was sent. States pharmacy gave him 7 pills until provider communicates whether or not he would like for pt to continue medication Memantine.     Please contact.

## 2024-11-18 RX ORDER — MEMANTINE HYDROCHLORIDE 10 MG/1
10 TABLET ORAL 2 TIMES DAILY
Qty: 60 TABLET | Refills: 5 | Status: SHIPPED | OUTPATIENT
Start: 2024-11-18

## 2024-11-19 NOTE — TELEPHONE ENCOUNTER
Called Robyn. HIPAA verified. Inform her Dr. Solis states is now on Namenda 10 mg BID. Prescription sent today to [facility] pharmacy. Robyn verbalizes understanding.

## 2024-12-16 ENCOUNTER — TELEPHONE (OUTPATIENT)
Age: 89
End: 2024-12-16

## 2024-12-16 NOTE — TELEPHONE ENCOUNTER
Patient's spouse is calling to inform that pt's medication Memantine is no longer working for him. States pt is irritable all the time and does not allow spouse or caretakers to assist him with changing his clothes when needed.     Would like to know if there is alternate medication that can be prescribed and if updated order could be sent to Home Health on how to assist her with pt.    Please contact.

## 2024-12-17 NOTE — TELEPHONE ENCOUNTER
Called Robyn. HIPAA verified. Pt schedule w/ Dr. Solis to discuss concerns for 12/18/24 at 3PM. Also inform Robyn that Dr. Solis states pt can stop Namenda. Robyn verbalizes understanding.

## 2024-12-18 ENCOUNTER — OFFICE VISIT (OUTPATIENT)
Age: 89
End: 2024-12-18
Payer: MEDICARE

## 2024-12-18 VITALS
HEART RATE: 82 BPM | SYSTOLIC BLOOD PRESSURE: 120 MMHG | DIASTOLIC BLOOD PRESSURE: 70 MMHG | TEMPERATURE: 96.2 F | OXYGEN SATURATION: 97 %

## 2024-12-18 DIAGNOSIS — G30.1 SEVERE LATE ONSET ALZHEIMER'S DEMENTIA WITH AGITATION (HCC): Primary | ICD-10-CM

## 2024-12-18 DIAGNOSIS — F02.C11 SEVERE LATE ONSET ALZHEIMER'S DEMENTIA WITH AGITATION (HCC): Primary | ICD-10-CM

## 2024-12-18 PROCEDURE — G8420 CALC BMI NORM PARAMETERS: HCPCS | Performed by: PSYCHIATRY & NEUROLOGY

## 2024-12-18 PROCEDURE — G8484 FLU IMMUNIZE NO ADMIN: HCPCS | Performed by: PSYCHIATRY & NEUROLOGY

## 2024-12-18 PROCEDURE — 1123F ACP DISCUSS/DSCN MKR DOCD: CPT | Performed by: PSYCHIATRY & NEUROLOGY

## 2024-12-18 PROCEDURE — 99215 OFFICE O/P EST HI 40 MIN: CPT | Performed by: PSYCHIATRY & NEUROLOGY

## 2024-12-18 PROCEDURE — G8428 CUR MEDS NOT DOCUMENT: HCPCS | Performed by: PSYCHIATRY & NEUROLOGY

## 2024-12-18 PROCEDURE — 1036F TOBACCO NON-USER: CPT | Performed by: PSYCHIATRY & NEUROLOGY

## 2024-12-18 RX ORDER — CLONAZEPAM 0.5 MG/1
0.25 TABLET ORAL DAILY PRN
Qty: 15 TABLET | Refills: 2 | Status: SHIPPED | OUTPATIENT
Start: 2024-12-18 | End: 2025-03-18

## 2024-12-18 NOTE — PROGRESS NOTES
Neurology Progress Note    Patient ID:  Allan Simon Sr.  [id number]  89 y.o.  1935      Subjective:   History:  Allan Simon Sr. is a 88 y.o. male who  has a past medical history of Back pain, CAD (coronary artery disease), Diabetes (HCC), Diabetes mellitus, type II (HCC), and Hypertension. Who for the past 5 to 6 yrs, patient noted progressive memory issues described as getting lost while driving. In Dec 2023, patient was found by police (apparently was swerving). Was brought to [facility]. Not driving anymore. (-) hallucinations. (+) agitation -placed on Seroquel 25 mg BID with benefit.  Wife takes care of finances and paying bills. Neuropsychological testing (9/23/24) Dementia - Severe, With Behavior Changes                        Since last time, patient had to stop Aricept and Namenda with reduction of agitation.    Takes Seroquel 25 mg BID but will fall asleep after breakfast.    Did home PT but will forget instructions.    Someone c/o the veterans comes to their house in the morning to help         ROS:  Per HPI-  Otherwise the remainder of ROS was negative    Social Hx  Social History     Socioeconomic History    Marital status:    Tobacco Use    Smoking status: Never    Smokeless tobacco: Never   Substance and Sexual Activity    Alcohol use: Yes    Drug use: Never       Meds:  Current Outpatient Medications on File Prior to Visit   Medication Sig Dispense Refill    rosuvastatin (CRESTOR) 40 MG tablet Take 1 tablet by mouth Every Day      QUEtiapine (SEROQUEL) 25 MG tablet TAKE 1 TABLET BY MOUTH TWICE DAILY FOR 90 DAYS      amLODIPine (NORVASC) 5 MG tablet Take 1 tablet by mouth daily      aspirin 81 MG EC tablet Take by mouth daily      lisinopril (PRINIVIL;ZESTRIL) 20 MG tablet Take 1 tablet by mouth daily      Magnesium 500 MG CAPS Take by mouth      metFORMIN (GLUCOPHAGE) 500 MG tablet Take 1 tablet by mouth 2 times daily (with meals)      omeprazole (PRILOSEC) 20 MG delayed release

## 2025-02-04 ENCOUNTER — TELEPHONE (OUTPATIENT)
Age: 89
End: 2025-02-04

## 2025-02-04 NOTE — TELEPHONE ENCOUNTER
Patient is now in [facility] full time now. The spouse is calling to see if they need to keep the Feb appt with Dr. Solis.    She can be reached at [phone]    Thanks!

## 2025-02-10 NOTE — TELEPHONE ENCOUNTER
Called Robyn. HIPAA verified. Inform Robyn that Dr. Solis states   if they have already have a doctor in the facility managing his symptoms and prescribing his meds, no need for follow up. Willow states that pt is not happy living in assisted living facility and she states she may have to move pt back home. Robyn states she will keep the appt scheduled for 2/26/25 with Dr. Solis.

## (undated) DEVICE — CONTAINER SPEC 20 ML LID NEUT BUFF FORMALIN 10 % POLYPR STS

## (undated) DEVICE — 1200 GUARD II KIT W/5MM TUBE W/O VAC TUBE: Brand: GUARDIAN

## (undated) DEVICE — CATH IV AUTOGRD BC BLU 22GA 25 -- INSYTE

## (undated) DEVICE — NDL FLTR TIP 5 MIC 18GX1.5IN --

## (undated) DEVICE — SYR 3ML LL TIP 1/10ML GRAD --

## (undated) DEVICE — SET ADMIN 16ML TBNG L100IN 2 Y INJ SITE IV PIGGY BK DISP

## (undated) DEVICE — BITEBLOCK ENDOSCP 60FR MAXI WHT POLYETH STURDY W/ VELC WVN

## (undated) DEVICE — SYR 5ML 1/5 GRAD LL NSAF LF --

## (undated) DEVICE — SNARE ENDOSCP M L240CM W27MM SHTH DIA2.4MM CHN 2.8MM OVL

## (undated) DEVICE — POLYP TRAP: Brand: TRAPEASE®

## (undated) DEVICE — CANNULA CUSH AD W/ 14FT TBG

## (undated) DEVICE — SNARE ENDOSCP L240CM LOOP W27MM SHTH DIA2.4MM WRK CHN 2.8MM

## (undated) DEVICE — BAG BELONG PT PERS CLEAR HANDL

## (undated) DEVICE — BASIN EMSIS 16OZ GRAPHITE PLAS KID SHP MOLD GRAD FOR ORAL

## (undated) DEVICE — Device

## (undated) DEVICE — SOLIDIFIER MEDC 1200ML -- CONVERT TO 356117

## (undated) DEVICE — NDL PRT INJ NSAF BLNT 18GX1.5 --

## (undated) DEVICE — KIT COLON W/ 1.1OZ LUB AND 2 END

## (undated) DEVICE — BAG SPEC BIOHZRD 10 X 10 IN --

## (undated) DEVICE — ELECTRODE,RADIOTRANSLUCENT,FOAM,3PK: Brand: MEDLINE